# Patient Record
Sex: FEMALE | Race: WHITE | Employment: UNEMPLOYED | URBAN - METROPOLITAN AREA
[De-identification: names, ages, dates, MRNs, and addresses within clinical notes are randomized per-mention and may not be internally consistent; named-entity substitution may affect disease eponyms.]

---

## 2017-10-15 ENCOUNTER — TRANSFERRED RECORDS (OUTPATIENT)
Dept: HEALTH INFORMATION MANAGEMENT | Facility: CLINIC | Age: 37
End: 2017-10-15

## 2017-10-16 ENCOUNTER — TRANSFERRED RECORDS (OUTPATIENT)
Dept: HEALTH INFORMATION MANAGEMENT | Facility: CLINIC | Age: 37
End: 2017-10-16

## 2017-10-20 ENCOUNTER — TRANSFERRED RECORDS (OUTPATIENT)
Dept: HEALTH INFORMATION MANAGEMENT | Facility: CLINIC | Age: 37
End: 2017-10-20

## 2017-10-30 ENCOUNTER — TELEPHONE (OUTPATIENT)
Dept: GASTROENTEROLOGY | Facility: CLINIC | Age: 37
End: 2017-10-30

## 2017-10-30 NOTE — TELEPHONE ENCOUNTER
35 Pages of medical records received.   Hard copy folder created and handed over to NAE JENNINGS (B.W) 10.30.17 @ 226p

## 2017-10-30 NOTE — TELEPHONE ENCOUNTER
Film room called since images were being pushed today.  Gee will be looking for this to attach to pt's mrn.     10.30.17 @ 319p

## 2017-11-01 ENCOUNTER — CARE COORDINATION (OUTPATIENT)
Dept: GASTROENTEROLOGY | Facility: CLINIC | Age: 37
End: 2017-11-01

## 2017-11-01 NOTE — PROGRESS NOTES
[facility] Intake form:    Referring/Requesting provider and Health care System: Rogelio Hernandez from [facility]     Clinic contact - Name, Phone and Fax number: Ying   Ph: [phone], Fax: [phone]     Requested provider (if specified): Dr. Cole     Has patient been evaluated in clinic or had a procedure [facility] provider in the last 5 years: X No       Indication/Diagnosis for consultation: Biliary Colic     Is diagnosis on list of approved diagnosis: X Yes     Has patient been evaluated by another Gastroenterologist? X No     Imaging completed:     CT scan     X Yes   MRI       X  Yes     Procedures:     Upper Endoscopy/EGD X Yes     Endoscopic Ultrasound/EUS X No     ERCP X Yes     Colonoscopy X No       Are images able/being pushed to our system? X No - Asked Ying at HIM to mail/push images.     Is patient aware of request for clinc consultation and ok to be contacted to schedule? X Yes     Referral Received: 10/208/2017    Hard Copy chart created/ Records received: 11/1/2017- no hard copy records    MD review date:                             Clinical History (per RN review of records provided):     Recommendations/Orders:

## 2017-11-03 NOTE — PROGRESS NOTES
Called referring clinic to have CT images sent to us.  Ying will have them pushed.  Called film room to have them watch for them.    Clinical History (per RN review of records provided):   RUQ abdominal pain, Hx: BRIANDA s/p Linx onn 2/2017, appendectomy, cholecystectomy, hysterectomy.  RUQ abdominal pain with elevated liver enzymes, radiates to back, normal lipase with slightly elevated AST and ALT on 10/25/2017, surgery consult with EGD on 10/16/2017 which was normal.  Was discharged home for outpatient MRCP and surgery follow up.    11/7/2017 - All images are now in PACS.  Plan to have Dr. Cole review hard copy chart 11/13/2017.    Tal GUNN RN Coordinator  Dr. Cole, Dr. Singleton & Dr. Bruce  [facility]  [phone]

## 2017-11-06 NOTE — PROGRESS NOTES
Additional medical records requested on 11.6.17   Please see sent letters for letter faxed.     Can be closed on: 12.6.17      11.6.17 @ 948a

## 2018-01-16 ENCOUNTER — TELEPHONE (OUTPATIENT)
Dept: GASTROENTEROLOGY | Facility: CLINIC | Age: 38
End: 2018-01-16

## 2018-01-16 NOTE — TELEPHONE ENCOUNTER
Spoke with patient reminding of upcoming appointment with Kavita Barnes 1/24.  She plans to attend. Number provided if needs to reschedule.

## 2018-01-24 ENCOUNTER — OFFICE VISIT (OUTPATIENT)
Dept: GASTROENTEROLOGY | Facility: CLINIC | Age: 38
End: 2018-01-24
Payer: COMMERCIAL

## 2018-01-24 VITALS
DIASTOLIC BLOOD PRESSURE: 65 MMHG | BODY MASS INDEX: 25.18 KG/M2 | HEIGHT: 69 IN | TEMPERATURE: 98.2 F | OXYGEN SATURATION: 96 % | SYSTOLIC BLOOD PRESSURE: 112 MMHG | WEIGHT: 170 LBS | HEART RATE: 87 BPM

## 2018-01-24 DIAGNOSIS — R10.11 ABDOMINAL PAIN, RIGHT UPPER QUADRANT: Primary | ICD-10-CM

## 2018-01-24 RX ORDER — HYDROXYZINE HYDROCHLORIDE 50 MG/1
TABLET, FILM COATED ORAL
COMMUNITY
Start: 2013-02-10

## 2018-01-24 RX ORDER — ALBUTEROL SULFATE 90 UG/1
2 AEROSOL, METERED RESPIRATORY (INHALATION) EVERY 6 HOURS
COMMUNITY

## 2018-01-24 ASSESSMENT — ENCOUNTER SYMPTOMS
BLOOD IN STOOL: 0
EXERCISE INTOLERANCE: 0
ABDOMINAL PAIN: 0
FEVER: 0
SYNCOPE: 0
BLOATING: 0
BOWEL INCONTINENCE: 0
MYALGIAS: 1
DEPRESSION: 1
NIGHT SWEATS: 0
POOR WOUND HEALING: 0
POLYDIPSIA: 0
SPUTUM PRODUCTION: 0
ORTHOPNEA: 0
DIARRHEA: 1
COUGH DISTURBING SLEEP: 0
WHEEZING: 0
MUSCLE WEAKNESS: 0
SLEEP DISTURBANCES DUE TO BREATHING: 0
COUGH: 0
DECREASED CONCENTRATION: 1
NERVOUS/ANXIOUS: 1
HEMOPTYSIS: 0
CHILLS: 1
NECK PAIN: 0
PANIC: 1
HEARTBURN: 0
NAUSEA: 1
HYPOTENSION: 0
WEIGHT LOSS: 0
POSTURAL DYSPNEA: 0
RECTAL PAIN: 0
LIGHT-HEADEDNESS: 1
DECREASED APPETITE: 1
WEIGHT GAIN: 0
ARTHRALGIAS: 1
HYPERTENSION: 0
INSOMNIA: 1
BACK PAIN: 1
JAUNDICE: 0
HALLUCINATIONS: 0
PALPITATIONS: 1
CONSTIPATION: 0
LEG PAIN: 0
INCREASED ENERGY: 0
NAIL CHANGES: 0
SHORTNESS OF BREATH: 0
STIFFNESS: 1
SNORES LOUDLY: 0
POLYPHAGIA: 0
ALTERED TEMPERATURE REGULATION: 1
VOMITING: 0
MUSCLE CRAMPS: 0
FATIGUE: 1
JOINT SWELLING: 0
DYSPNEA ON EXERTION: 1
SKIN CHANGES: 0

## 2018-01-24 ASSESSMENT — PAIN SCALES - GENERAL: PAINLEVEL: NO PAIN (0)

## 2018-01-24 NOTE — LETTER
"1/24/2018       RE: Glenny Keene  [address]   [facility] [address]     Dear Colleague,    Thank you for referring your patient, Glenny Keene, to the [facility] at [facility]. Please see a copy of my visit note below.    REASON FOR CONSULT: \"Possible Sphincter of Oddi dysfunction\"   REFERRING PHYSICIAN: Dr. Rogelio Hernandez MD of [facility] in [place], [place].        HISTORY OF PRESENT ILLNESS:     Glenny Keene is a 37 year old female with History of GERD s/p Linx in 2/17, appendectomy, cholecystectomy in 2011 for biliary colic, hysterectomy, history of post-cholecystectomy abdominal pain associated with elevated LFT's. She reports following cholecystectomy she continued to have RUQ abdominal pain, and found to have elevated liver studies. As a results, she underwent ERCP in 2013 for further evaluation done by Dr. Verdin in [facility], this was complicated by small bowel perforation and bile duct injury/rupture requiring stent placement. She was later seen at [facility] in 2013 where she underwent diagnostic ERCP without intervention (per patient, no records of this). She denies hx of pancreatitis or bile duct stones. She was hospitalized  in October for abdominal pain, noted to have elevated liver studies upon admission. She underwent extensive workup including US of abdomen, EGD, CT scan of abdomen, and MRCP. RUQ ultrasound completed on 10/16 revealed mild postoperative dilation of the CBD measuring up to 7 mm, without choledolithiasis. CT scan done on 10/16/17 showed mild postoperative biliary dilation and good position of LINX. MRCP completed on 10/20/17 showed mild dilation of the CBD measuring up to 6 mm in size without evidence of choledocholithiasis. EGD done while hospitalized, showed no acute findings and LINX to be in good position.   She reports RUQ abdominal pain that is severe and occurs every few months. The " pain is sharp and nagging at times, often comes on suddenly requiring ER visits. She reports 3-4 ER visits in the past. She states she is  pain free in between pain flares. Denies  daily narcotic use. She takes ibuprofen and tylenol as needed. Pain is not associated with eating. She reports daily nausea and intermittent diarrhea since hospital discharge.  Weight has been stable. She reports good control of acid reflux symptoms. She denies vomiting, constipation, blood in stool, juandice, itching, dark-colored urine, fever, night sweats or chills.       PAST MEDICAL HISTORY:  ADHD   Anxiety   Depression   Asthma   GERD   PTSD       PAST SURGICAL HISTORY:   Appendectomy   ERCP (ruptured bile duct, perforated bowel)   Lap LINX procedure   Partial Hysterectomy   Laparoscopic cholecystectomy          MEDICATIONS:   See Epic medication list, medications reviewed       FAMILY HISTORY:  Noncontributory       SOCIAL HISTORY:    Home situation: lives at home with 3 children   Occupation/Schooling: not employed   Tobacco use: 1/2 pack per day   Alcohol use: history of heavy alcohol use, sober since    Drug use:  Meth and cocaine use sober since 2013      REVIEW OF SYSTEMS:   A comprehensive review of systems was performed and was noted to be negative except as above.      PHYSICAL EXAM:  VITAL SIGNS: Stable  GENERAL: healthy, alert, well nourished, well hydrated, no distress  EYES: Eyes grossly normal to inspection, anicteric sclera   RESP: lungs clear to auscultation - no rales, no rhonchi, no wheezes  CV: regular rates and rhythm, normal S1 S2, no murmur   ABDOMEN: soft, no tenderness, no hepatosplenomegaly, no masses, distention, guarding, or rebound, normal bowel sounds  PSYCH: Alert and oriented times 3      ASSESSMENT AND RECOMMENDATIONS:   Glenny Keene is a 37 year old female with history of GERD s/p Linx in ,  post-cholecystectomy abdominal pain associated with elevated LFT's and mildly  dilated bile duct. She underwent extensive workup including US of abdomen, EGD, CT scan of abdomen, and MRCP. RUQ ultrasound completed on 10/16 revealed mild postoperative dilation of the CBD measuring up to 7 mm, without choledolithiasis. CT scan done on 10/16/17 showed mild postoperative biliary dilation and good position of LINX. MRCP completed on 10/20/17 showed mild dilation of the CBD measuring up to 6 mm in size without evidence of choledocholithiasis. Differential includes Sphincter of Oddi dysfunction, choledocholithiasis, and pancreatitis. At this juncture, we recommend proceeding with endoscopic ultrasound to detect if her symptoms may be contributed to common bile duct stone, and to evaluate for subtle chronic pancreatitis. We discussed at length the value of endoscopic sphincterotomy for suspected SOD. Patient was informed of uncertain benefit in relieving pain, and 50% chance she may continue to experience recurring pain. We discussed procedure-related risk, including post-ERCP pancreatitis rates are 5- 10%, risk of perforation, bleeding, infection, and risk for major complications. She verbalized understanding and wants to proceed with ERCP. She was given a trial of pancreatic enzymes (Creon) to take with meals. Recommend low fat diet and she meet with dietitian to discuss further dietary recommendations. She was advised the importance of smoking cessation.         Patient seen in conjunction with Dr. Douglas Barnes CNP   Advanced [facility]oscopy   [phone]        I spent 45 minutes with this patient face to face and explained the conditions and plans (more than 50% of time was counseling/coordination of care, as discussed above).    Again, thank you for allowing me to participate in the care of your patient.      Sincerely,    LILIBETH Monroe CNP

## 2018-01-24 NOTE — MR AVS SNAPSHOT
After Visit Summary   1/24/2018    Glenny Keene    MRN: [id number]           Patient Information     Date Of Birth          1980        Visit Information        Provider Department      1/24/2018 2:00 PM Kavita Barnes APRN [facility]        Today's Diagnoses     Abdominal pain, right upper quadrant    -  1      Care Instructions    It was a pleasure taking care of you today.  I've included a brief summary of our discussion and care plan from today's visit below.  Please review this information with your primary care provider.  ______________________________________________________________________    My recommendations are summarized as follows:  1. Take pancreatic enzymes with meals,2 with meals 1 with snacks  2.  ERCP with DR. Cole      ______________________________________________________________________    Who do I call with any questions after my visit?  Please be in touch if there are any further questions that arise following today's visit.  There are multiple ways to contact your gastroenterology care team.        During business hours, you may reach a Gastroenterology nurse at [phone].       To schedule or reschedule an appointment, please call [phone].       You can always send a secure message through [employer].  [employer] messages are answered by your nurse or doctor typically within 24 hours.  Please allow extra time on weekends and holidays.        For urgent/emergent questions after business hours, you may reach the on-call GI Fellow by contacting the [facility] at [phone].         How will I get the results of any tests ordered?    You will receive all of your results.  If you have signed up for [employer], any tests ordered at your visit will be available to you after your physician reviews them.  Typically this takes 1-2 weeks.  If there are urgent results that require a change in your care plan, your physician  or nurse will call you to discuss the next steps.      What is [employer]?  [employer] is a secure way for you to access all of your healthcare records from the [facility].  It is a web based computer program, so you can sign on to it from any location.  It also allows you to send secure messages to your care team.  I recommend signing up for [employer] access if you have not already done so and are comfortable with using a computer.      How to I schedule a follow-up visit?  If you did not schedule a follow-up visit today, please call [phone] to schedule a follow-up office visit.                Who do I call for additional appointments?      To schedule an appointment with the Pain Clinic, please call [phone].      To schedule an appointment with the Radiology Department, please call [phone].      To schedule an appointment with the Pre-procedure Assessment Clinic (PAC), please call [phone].      To schedule your Endoscopy appointment, please call [phone].      To schedule an appointment with the Dietitian, please call [phone].      To schedule an appointment with the Financial Counselor, please call [phone].      To schedule an appointment with our Health Psychologist, please call         [phone].           Thank you,    Kavita Barnes, CNP  [facility]  Division of Gastroenterology                  Follow-ups after your visit        Future tests that were ordered for you today     Open Future Orders        Priority Expected Expires Ordered    Upper EUS with ERCP Routine  3/10/2018 1/24/2018            Who to contact     Please call your clinic at [phone] to:    Ask questions about your health    Make or cancel appointments    Discuss your medicines    Learn about your test results    Speak to your doctor   If you have compliments or concerns about an experience at your clinic, or if you wish to file a complaint, please contact [facility]  "Physicians Patient Relations at [phone] or email us at [email or web address]         Additional Information About Your Visit        [employer] Information     [employer] gives you secure access to your electronic health record. If you see a primary care provider, you can also send messages to your care team and make appointments. If you have questions, please call your primary care clinic.  If you do not have a primary care provider, please call [phone] and they will assist you.      [employer] is an electronic gateway that provides easy, online access to your medical records. With [employer], you can request a clinic appointment, read your test results, renew a prescription or communicate with your care team.     To access your existing account, please contact your [facility] or call [phone] for assistance.        Care EveryWhere ID     This is your Care EveryWhere ID. This could be used by other organizations to access your [facility] medical records  [id number]        Your Vitals Were     Pulse Temperature Height Pulse Oximetry BMI (Body Mass Index)       87 98.2  F (36.8  C) (Oral) 1.753 m (5' 9\") 96% 25.1 kg/m2        Blood Pressure from Last 3 Encounters:   01/24/18 112/65    Weight from Last 3 Encounters:   01/24/18 77.1 kg (170 lb)                 Today's Medication Changes          These changes are accurate as of 1/24/18  3:35 PM.  If you have any questions, ask your nurse or doctor.               Start taking these medicines.        Dose/Directions    amylase-lipase-protease 07038-38350 UNITS Cpep per EC capsule   Commonly known as:  CREON   Used for:  Abdominal pain, right upper quadrant   Started by:  Kavita Barnes APRN CNP        Take 2 with meals / 1 snacks, up to 9 per day.   Quantity:  450 capsule   Refills:  6            Where to get your medicines      These medications were sent to [id number] - [place], [place] - [address]  [address]  [place] [address]     Phone:  [phone]     amylase-lipase-protease 49042-47181 UNITS Cpep per EC capsule                Primary Care Provider Office Phone # Fax #    Alicia BARNHART Deirdre, TYRA [phone]       [facility] [address] [address]        Equal Access to Services     [facility]: Hadii aad ku hadasho Soomaali, waaxda luqadaha, qaybta kaalmada adeegyada, waxay idiin hayaan adeeg kharash labhumi . So [facility] [phone].    ATENCIÓN: Si habla español, tiene a baron disposición servicios gratuitos de asistencia lingüística. Sugeyisaiah al [phone].    We comply with applicable federal civil rights laws and [place] laws. We do not discriminate on the basis of race, color, national origin, age, disability, sex, sexual orientation, or gender identity.            Thank you!     Thank you for choosing [facility]  for your care. Our goal is always to provide you with excellent care. Hearing back from our patients is one way we can continue to improve our services. Please take a few minutes to complete the written survey that you may receive in the mail after your visit with us. Thank you!             Your Updated Medication List - Protect others around you: Learn how to safely use, store and throw away your medicines at www.disposemymeds.org.          This list is accurate as of 1/24/18  3:35 PM.  Always use your most recent med list.                   Brand Name Dispense Instructions for use Diagnosis    albuterol 108 (90 BASE) MCG/ACT Inhaler    PROAIR HFA/PROVENTIL HFA/VENTOLIN HFA     Inhale 2 puffs into the lungs every 6 hours        amylase-lipase-protease 00533-10001 UNITS Cpep per EC capsule    CREON    450 capsule    Take 2 with meals / 1 snacks, up to 9 per day.    Abdominal pain, right upper quadrant       fluticasone 27.5 MCG/SPRAY spray    VERAMYST          hydrOXYzine 50 MG tablet    ATARAX          LORATADINE-D 24HR PO

## 2018-01-24 NOTE — PATIENT INSTRUCTIONS
It was a pleasure taking care of you today.  I've included a brief summary of our discussion and care plan from today's visit below.  Please review this information with your primary care provider.  ______________________________________________________________________    My recommendations are summarized as follows:  1. Take pancreatic enzymes with meals,2 with meals 1 with snacks  2.  ERCP with DR. Cole      ______________________________________________________________________    Who do I call with any questions after my visit?  Please be in touch if there are any further questions that arise following today's visit.  There are multiple ways to contact your gastroenterology care team.        During business hours, you may reach a Gastroenterology nurse at [phone].       To schedule or reschedule an appointment, please call [phone].       You can always send a secure message through [employer].  [employer] messages are answered by your nurse or doctor typically within 24 hours.  Please allow extra time on weekends and holidays.        For urgent/emergent questions after business hours, you may reach the on-call GI Fellow by contacting the [facility]  at [phone].         How will I get the results of any tests ordered?    You will receive all of your results.  If you have signed up for [employer], any tests ordered at your visit will be available to you after your physician reviews them.  Typically this takes 1-2 weeks.  If there are urgent results that require a change in your care plan, your physician or nurse will call you to discuss the next steps.      What is [employer]?  [employer] is a secure way for you to access all of your healthcare records from the [facility].  It is a web based computer program, so you can sign on to it from any location.  It also allows you to send secure messages to your care team.  I recommend signing up for [email or web address] access if you have not already  done so and are comfortable with using a computer.      How to I schedule a follow-up visit?  If you did not schedule a follow-up visit today, please call [phone] to schedule a follow-up office visit.                Who do I call for additional appointments?      To schedule an appointment with the Pain Clinic, please call [phone].      To schedule an appointment with the Radiology Department, please call [phone].      To schedule an appointment with the Pre-procedure Assessment Clinic (PAC), please call [phone].      To schedule your Endoscopy appointment, please call [phone].      To schedule an appointment with the Dietitian, please call [phone].      To schedule an appointment with the Financial Counselor, please call [phone].      To schedule an appointment with our Health Psychologist, please call         [phone].           Thank you,    Kavita Barnes, CNP  [facility]  Division of Gastroenterology

## 2018-01-24 NOTE — PROGRESS NOTES
"REASON FOR CONSULT: \"Possible Sphincter of Oddi dysfunction\"   REFERRING PHYSICIAN: Dr. Rogelio Hernandez MD of [facility] in [place], [place].        HISTORY OF PRESENT ILLNESS:     Glenny Keene is a 37 year old female with History of GERD s/p Linx in 2/17, appendectomy, cholecystectomy in 2011 for biliary colic, hysterectomy, history of post-cholecystectomy abdominal pain associated with elevated LFT's. She reports following cholecystectomy she continued to have RUQ abdominal pain, and found to have elevated liver studies. As a results, she underwent ERCP in 2013 for further evaluation done by Dr. Verdin in [facility], this was complicated by small bowel perforation and bile duct injury/rupture requiring stent placement. She was later seen at [facility] in 2013 where she underwent diagnostic ERCP without intervention (per patient, no records of this). She denies hx of pancreatitis or bile duct stones. She was hospitalized  in October for abdominal pain, noted to have elevated liver studies upon admission. She underwent extensive workup including US of abdomen, EGD, CT scan of abdomen, and MRCP. RUQ ultrasound completed on 10/16 revealed mild postoperative dilation of the CBD measuring up to 7 mm, without choledolithiasis. CT scan done on 10/16/17 showed mild postoperative biliary dilation and good position of LINX. MRCP completed on 10/20/17 showed mild dilation of the CBD measuring up to 6 mm in size without evidence of choledocholithiasis. EGD done while hospitalized, showed no acute findings and LINX to be in good position.   She reports RUQ abdominal pain that is severe and occurs every few months. The pain is sharp and nagging at times, often comes on suddenly requiring ER visits. She reports 3-4 ER visits in the past. She states she is  pain free in between pain flares. Denies  daily narcotic use. She takes ibuprofen and tylenol as needed. Pain is not associated with eating. She reports " daily nausea and intermittent diarrhea since hospital discharge.  Weight has been stable. She reports good control of acid reflux symptoms. She denies vomiting, constipation, blood in stool, juandice, itching, dark-colored urine, fever, night sweats or chills.       PAST MEDICAL HISTORY:  ADHD   Anxiety   Depression   Asthma   GERD   PTSD       PAST SURGICAL HISTORY:   Appendectomy   ERCP (ruptured bile duct, perforated bowel)   Lap LINX procedure   Partial Hysterectomy   Laparoscopic cholecystectomy          MEDICATIONS:   See Epic medication list, medications reviewed       FAMILY HISTORY:  Noncontributory       SOCIAL HISTORY:    Home situation: lives at home with 3 children   Occupation/Schooling: not employed   Tobacco use: 1/2 pack per day   Alcohol use: history of heavy alcohol use, sober since    Drug use:  Meth and cocaine use sober since 2013      REVIEW OF SYSTEMS:   A comprehensive review of systems was performed and was noted to be negative except as above.      PHYSICAL EXAM:  VITAL SIGNS: Stable  GENERAL: healthy, alert, well nourished, well hydrated, no distress  EYES: Eyes grossly normal to inspection, anicteric sclera   RESP: lungs clear to auscultation - no rales, no rhonchi, no wheezes  CV: regular rates and rhythm, normal S1 S2, no murmur   ABDOMEN: soft, no tenderness, no hepatosplenomegaly, no masses, distention, guarding, or rebound, normal bowel sounds  PSYCH: Alert and oriented times 3      ASSESSMENT AND RECOMMENDATIONS:   Glenny Keene is a 37 year old female with history of GERD s/p Linx in ,  post-cholecystectomy abdominal pain associated with elevated LFT's and mildly dilated bile duct. She underwent extensive workup including US of abdomen, EGD, CT scan of abdomen, and MRCP. RUQ ultrasound completed on 10/16 revealed mild postoperative dilation of the CBD measuring up to 7 mm, without choledolithiasis. CT scan done on 10/16/17 showed mild postoperative  biliary dilation and good position of LINX. MRCP completed on 10/20/17 showed mild dilation of the CBD measuring up to 6 mm in size without evidence of choledocholithiasis. Differential includes Sphincter of Oddi dysfunction, choledocholithiasis, and pancreatitis. At this juncture, we recommend proceeding with endoscopic ultrasound to detect if her symptoms may be contributed to common bile duct stone, and to evaluate for subtle chronic pancreatitis. We discussed at length the value of endoscopic sphincterotomy for suspected SOD. Patient was informed of uncertain benefit in relieving pain, and 50% chance she may continue to experience recurring pain. We discussed procedure-related risk, including post-ERCP pancreatitis rates are 5- 10%, risk of perforation, bleeding, infection, and risk for major complications. She verbalized understanding and wants to proceed with ERCP. She was given a trial of pancreatic enzymes (Creon) to take with meals. Recommend low fat diet and she meet with dietitian to discuss further dietary recommendations. She was advised the importance of smoking cessation.         Patient seen in conjunction with Dr. Douglas Barnes, [facility]   Advanced Endoscopy   [phone]        I spent 45 minutes with this patient face to face and explained the conditions and plans (more than 50% of time was counseling/coordination of care, as discussed above).           Answers for HPI/ROS submitted by the patient on 1/24/2018   General Symptoms: Yes  Skin Symptoms: Yes  HENT Symptoms: No  EYE SYMPTOMS: No  HEART SYMPTOMS: Yes  LUNG SYMPTOMS: Yes  INTESTINAL SYMPTOMS: Yes  URINARY SYMPTOMS: No  GYNECOLOGIC SYMPTOMS: No  BREAST SYMPTOMS: No  SKELETAL SYMPTOMS: Yes  BLOOD SYMPTOMS: No  NERVOUS SYSTEM SYMPTOMS: No  MENTAL HEALTH SYMPTOMS: Yes  Fever: No  Loss of appetite: Yes  Weight loss: No  Weight gain: No  Fatigue: Yes  Night sweats: No  Chills: Yes  Increased stress: Yes  Excessive hunger: No  Excessive  thirst: No  Feeling hot or cold when others believe the temperature is normal: Yes  Loss of height: No  Post-operative complications: No  Surgical site pain: No  Hallucinations: No  Change in or Loss of Energy: No  Hyperactivity: Yes  Confusion: No  Changes in hair: No  Changes in moles/birth marks: No  Itching: No  Rashes: No  Changes in nails: No  Acne: No  Hair in places you don't want it: No  Change in facial hair: No  Warts: No  Non-healing sores: No  Scarring: No  Flaking of skin: No  Color changes of hands/feet in cold : Yes  Sun sensitivity: No  Skin thickening: No  Cough: No  Sputum or phlegm: No  Coughing up blood: No  Difficulty breating or shortness of breath: No  Snoring: No  Wheezing: No  Difficulty breathing on exertion: Yes  Nighttime Cough: No  Difficulty breathing when lying flat: No  Chest pain or pressure: Yes  Fast or irregular heartbeat: Yes  Pain in legs with walking: No  Trouble breathing while lying down: No  Fingers or toes appear blue: No  High blood pressure: No  Low blood pressure: No  Fainting: No  Murmurs: No  Pacemaker: No  Varicose veins: No  Edema or swelling: No  Wake up at night with shortness of breath: No  Light-headedness: Yes  Exercise intolerance: No  Heart burn or indigestion: No  Nausea: Yes  Vomiting: No  Abdominal pain: No  Bloating: No  Constipation: No  Diarrhea: Yes  Blood in stool: No  Black stools: No  Rectal or Anal pain: No  Fecal incontinence: No  Yellowing of skin or eyes: No  Vomit with blood: No  Change in stools: No  Back pain: Yes  Muscle aches: Yes  Neck pain: No  Swollen joints: No  Joint pain: Yes  Bone pain: Yes  Muscle cramps: No  Muscle weakness: No  Joint stiffness: Yes  Bone fracture: No  Nervous or Anxious: Yes  Depression: Yes  Trouble sleeping: Yes  Trouble thinking or concentrating: Yes  Mood changes: Yes  Panic attacks: Yes

## 2018-01-24 NOTE — NURSING NOTE
"Chief Complaint   Patient presents with     Clinic Care Coordination - Initial     New pt consult.        Vitals:    01/24/18 1416   BP: 112/65   BP Location: Left arm   Patient Position: Chair   Cuff Size: Adult Regular   Pulse: 87   Temp: 98.2  F (36.8  C)   TempSrc: Oral   SpO2: 96%   Weight: 170 lb   Height: 5' 9\"       Body mass index is 25.1 kg/(m^2).  Melanie MARX LPN                  "

## 2018-01-27 ENCOUNTER — HEALTH MAINTENANCE LETTER (OUTPATIENT)
Age: 38
End: 2018-01-27

## 2018-02-28 ENCOUNTER — ANESTHESIA EVENT (OUTPATIENT)
Dept: SURGERY | Facility: CLINIC | Age: 38
End: 2018-02-28
Payer: COMMERCIAL

## 2018-02-28 ENCOUNTER — SURGERY (OUTPATIENT)
Age: 38
End: 2018-02-28

## 2018-02-28 ENCOUNTER — ANESTHESIA (OUTPATIENT)
Dept: SURGERY | Facility: CLINIC | Age: 38
End: 2018-02-28
Payer: COMMERCIAL

## 2018-02-28 ENCOUNTER — APPOINTMENT (OUTPATIENT)
Dept: GENERAL RADIOLOGY | Facility: CLINIC | Age: 38
End: 2018-02-28
Attending: INTERNAL MEDICINE
Payer: COMMERCIAL

## 2018-02-28 ENCOUNTER — HOSPITAL ENCOUNTER (OUTPATIENT)
Facility: CLINIC | Age: 38
Discharge: HOME | End: 2018-02-28
Attending: INTERNAL MEDICINE | Admitting: INTERNAL MEDICINE
Payer: COMMERCIAL

## 2018-02-28 VITALS
WEIGHT: 175.04 LBS | RESPIRATION RATE: 15 BRPM | HEART RATE: 76 BPM | SYSTOLIC BLOOD PRESSURE: 109 MMHG | HEIGHT: 69 IN | BODY MASS INDEX: 25.93 KG/M2 | OXYGEN SATURATION: 96 % | TEMPERATURE: 97.6 F | DIASTOLIC BLOOD PRESSURE: 74 MMHG

## 2018-02-28 LAB
ALBUMIN SERPL-MCNC: 3.3 G/DL (ref 3.4–5)
ALP SERPL-CCNC: 58 U/L (ref 40–150)
ALT SERPL W P-5'-P-CCNC: 58 U/L (ref 0–50)
AMYLASE SERPL-CCNC: 70 U/L (ref 30–110)
ANION GAP SERPL CALCULATED.3IONS-SCNC: 5 MMOL/L (ref 3–14)
AST SERPL W P-5'-P-CCNC: 41 U/L (ref 0–45)
BILIRUB SERPL-MCNC: 0.3 MG/DL (ref 0.2–1.3)
BUN SERPL-MCNC: 13 MG/DL (ref 7–30)
CALCIUM SERPL-MCNC: 8.6 MG/DL (ref 8.5–10.1)
CHLORIDE SERPL-SCNC: 103 MMOL/L (ref 94–109)
CO2 SERPL-SCNC: 31 MMOL/L (ref 20–32)
CREAT SERPL-MCNC: 0.96 MG/DL (ref 0.52–1.04)
ERCP: NORMAL
ERYTHROCYTE [DISTWIDTH] IN BLOOD BY AUTOMATED COUNT: 12.4 % (ref 10–15)
GFR SERPL CREATININE-BSD FRML MDRD: 65 ML/MIN/1.7M2
GLUCOSE BLDC GLUCOMTR-MCNC: 95 MG/DL (ref 70–99)
GLUCOSE SERPL-MCNC: 90 MG/DL (ref 70–99)
HCG UR QL: NEGATIVE
HCT VFR BLD AUTO: 43.6 % (ref 35–47)
HGB BLD-MCNC: 14.2 G/DL (ref 11.7–15.7)
LIPASE SERPL-CCNC: 215 U/L (ref 73–393)
MCH RBC QN AUTO: 29.8 PG (ref 26.5–33)
MCHC RBC AUTO-ENTMCNC: 32.6 G/DL (ref 31.5–36.5)
MCV RBC AUTO: 91 FL (ref 78–100)
PLATELET # BLD AUTO: 221 10E9/L (ref 150–450)
POTASSIUM SERPL-SCNC: 3.8 MMOL/L (ref 3.4–5.3)
PROT SERPL-MCNC: 6.6 G/DL (ref 6.8–8.8)
RBC # BLD AUTO: 4.77 10E12/L (ref 3.8–5.2)
SODIUM SERPL-SCNC: 139 MMOL/L (ref 133–144)
UPPER EUS: NORMAL
WBC # BLD AUTO: 7.2 10E9/L (ref 4–11)

## 2018-02-28 PROCEDURE — 25000128 H RX IP 250 OP 636: Performed by: ANESTHESIOLOGY

## 2018-02-28 PROCEDURE — 36000059 ZZH SURGERY LEVEL 3 EA 15 ADDTL MIN UMMC: Performed by: INTERNAL MEDICINE

## 2018-02-28 PROCEDURE — 71000015 ZZH RECOVERY PHASE 1 LEVEL 2 EA ADDTL HR: Performed by: INTERNAL MEDICINE

## 2018-02-28 PROCEDURE — 25000125 ZZHC RX 250: Performed by: ANESTHESIOLOGY

## 2018-02-28 PROCEDURE — 80053 COMPREHEN METABOLIC PANEL: CPT | Performed by: INTERNAL MEDICINE

## 2018-02-28 PROCEDURE — 36000061 ZZH SURGERY LEVEL 3 W FLUORO 1ST 30 MIN - UMMC: Performed by: INTERNAL MEDICINE

## 2018-02-28 PROCEDURE — 83690 ASSAY OF LIPASE: CPT | Performed by: INTERNAL MEDICINE

## 2018-02-28 PROCEDURE — 82962 GLUCOSE BLOOD TEST: CPT

## 2018-02-28 PROCEDURE — 25000125 ZZHC RX 250: Performed by: NURSE ANESTHETIST, CERTIFIED REGISTERED

## 2018-02-28 PROCEDURE — 71000027 ZZH RECOVERY PHASE 2 EACH 15 MINS: Performed by: INTERNAL MEDICINE

## 2018-02-28 PROCEDURE — 25000125 ZZHC RX 250: Performed by: INTERNAL MEDICINE

## 2018-02-28 PROCEDURE — 25000566 ZZH SEVOFLURANE, EA 15 MIN: Performed by: INTERNAL MEDICINE

## 2018-02-28 PROCEDURE — 27210794 ZZH OR GENERAL SUPPLY STERILE: Performed by: INTERNAL MEDICINE

## 2018-02-28 PROCEDURE — 40000277 XR SURGERY CARM FLUORO LESS THAN 5 MIN W STILLS: Mod: TC

## 2018-02-28 PROCEDURE — 37000008 ZZH ANESTHESIA TECHNICAL FEE, 1ST 30 MIN: Performed by: INTERNAL MEDICINE

## 2018-02-28 PROCEDURE — 27210995 ZZH RX 272: Performed by: INTERNAL MEDICINE

## 2018-02-28 PROCEDURE — 25000132 ZZH RX MED GY IP 250 OP 250 PS 637: Performed by: ANESTHESIOLOGY

## 2018-02-28 PROCEDURE — 81025 URINE PREGNANCY TEST: CPT | Performed by: ANESTHESIOLOGY

## 2018-02-28 PROCEDURE — C1725 CATH, TRANSLUMIN NON-LASER: HCPCS | Performed by: INTERNAL MEDICINE

## 2018-02-28 PROCEDURE — 85027 COMPLETE CBC AUTOMATED: CPT | Performed by: INTERNAL MEDICINE

## 2018-02-28 PROCEDURE — C1726 CATH, BAL DIL, NON-VASCULAR: HCPCS | Performed by: INTERNAL MEDICINE

## 2018-02-28 PROCEDURE — 71000014 ZZH RECOVERY PHASE 1 LEVEL 2 FIRST HR: Performed by: INTERNAL MEDICINE

## 2018-02-28 PROCEDURE — 36415 COLL VENOUS BLD VENIPUNCTURE: CPT | Performed by: INTERNAL MEDICINE

## 2018-02-28 PROCEDURE — C1769 GUIDE WIRE: HCPCS | Performed by: INTERNAL MEDICINE

## 2018-02-28 PROCEDURE — C9399 UNCLASSIFIED DRUGS OR BIOLOG: HCPCS | Performed by: NURSE ANESTHETIST, CERTIFIED REGISTERED

## 2018-02-28 PROCEDURE — 82150 ASSAY OF AMYLASE: CPT | Performed by: INTERNAL MEDICINE

## 2018-02-28 PROCEDURE — 40000170 ZZH STATISTIC PRE-PROCEDURE ASSESSMENT II: Performed by: INTERNAL MEDICINE

## 2018-02-28 PROCEDURE — 25000128 H RX IP 250 OP 636: Performed by: NURSE ANESTHETIST, CERTIFIED REGISTERED

## 2018-02-28 PROCEDURE — 37000009 ZZH ANESTHESIA TECHNICAL FEE, EACH ADDTL 15 MIN: Performed by: INTERNAL MEDICINE

## 2018-02-28 PROCEDURE — C1877 STENT, NON-COAT/COV W/O DEL: HCPCS | Performed by: INTERNAL MEDICINE

## 2018-02-28 DEVICE — STENT FREEMAN PANCREA FLEX 7FRX7CM SGL PIGTAIL: Type: IMPLANTABLE DEVICE | Site: BILE DUCT | Status: FUNCTIONAL

## 2018-02-28 RX ORDER — DEXAMETHASONE SODIUM PHOSPHATE 4 MG/ML
INJECTION, SOLUTION INTRA-ARTICULAR; INTRALESIONAL; INTRAMUSCULAR; INTRAVENOUS; SOFT TISSUE PRN
Status: DISCONTINUED | OUTPATIENT
Start: 2018-02-28 | End: 2018-02-28

## 2018-02-28 RX ORDER — OXYCODONE HYDROCHLORIDE 5 MG/1
5 TABLET ORAL EVERY 4 HOURS PRN
Status: DISCONTINUED | OUTPATIENT
Start: 2018-02-28 | End: 2018-02-28 | Stop reason: HOSPADM

## 2018-02-28 RX ORDER — NALOXONE HYDROCHLORIDE 0.4 MG/ML
.1-.4 INJECTION, SOLUTION INTRAMUSCULAR; INTRAVENOUS; SUBCUTANEOUS
Status: DISCONTINUED | OUTPATIENT
Start: 2018-02-28 | End: 2018-02-28 | Stop reason: HOSPADM

## 2018-02-28 RX ORDER — SCOLOPAMINE TRANSDERMAL SYSTEM 1 MG/1
1 PATCH, EXTENDED RELEASE TRANSDERMAL
Status: DISCONTINUED | OUTPATIENT
Start: 2018-02-28 | End: 2018-02-28 | Stop reason: HOSPADM

## 2018-02-28 RX ORDER — LIDOCAINE HYDROCHLORIDE 20 MG/ML
INJECTION, SOLUTION INFILTRATION; PERINEURAL PRN
Status: DISCONTINUED | OUTPATIENT
Start: 2018-02-28 | End: 2018-02-28

## 2018-02-28 RX ORDER — DIMENHYDRINATE 50 MG/ML
25 INJECTION, SOLUTION INTRAMUSCULAR; INTRAVENOUS
Status: DISCONTINUED | OUTPATIENT
Start: 2018-02-28 | End: 2018-02-28 | Stop reason: HOSPADM

## 2018-02-28 RX ORDER — FENTANYL CITRATE 50 UG/ML
INJECTION, SOLUTION INTRAMUSCULAR; INTRAVENOUS PRN
Status: DISCONTINUED | OUTPATIENT
Start: 2018-02-28 | End: 2018-02-28

## 2018-02-28 RX ORDER — SODIUM CHLORIDE, SODIUM LACTATE, POTASSIUM CHLORIDE, CALCIUM CHLORIDE 600; 310; 30; 20 MG/100ML; MG/100ML; MG/100ML; MG/100ML
INJECTION, SOLUTION INTRAVENOUS CONTINUOUS
Status: DISCONTINUED | OUTPATIENT
Start: 2018-02-28 | End: 2018-02-28 | Stop reason: HOSPADM

## 2018-02-28 RX ORDER — LIDOCAINE 40 MG/G
CREAM TOPICAL
Status: DISCONTINUED | OUTPATIENT
Start: 2018-02-28 | End: 2018-02-28 | Stop reason: HOSPADM

## 2018-02-28 RX ORDER — FLUMAZENIL 0.1 MG/ML
0.2 INJECTION, SOLUTION INTRAVENOUS
Status: DISCONTINUED | OUTPATIENT
Start: 2018-02-28 | End: 2018-02-28 | Stop reason: HOSPADM

## 2018-02-28 RX ORDER — PROMETHAZINE HYDROCHLORIDE 25 MG/ML
12.5 INJECTION, SOLUTION INTRAMUSCULAR; INTRAVENOUS
Status: DISCONTINUED | OUTPATIENT
Start: 2018-02-28 | End: 2018-02-28 | Stop reason: HOSPADM

## 2018-02-28 RX ORDER — PROPOFOL 10 MG/ML
INJECTION, EMULSION INTRAVENOUS PRN
Status: DISCONTINUED | OUTPATIENT
Start: 2018-02-28 | End: 2018-02-28

## 2018-02-28 RX ORDER — FENTANYL CITRATE 50 UG/ML
25-50 INJECTION, SOLUTION INTRAMUSCULAR; INTRAVENOUS
Status: DISCONTINUED | OUTPATIENT
Start: 2018-02-28 | End: 2018-02-28 | Stop reason: HOSPADM

## 2018-02-28 RX ORDER — INDOMETHACIN 50 MG/1
100 SUPPOSITORY RECTAL
Status: COMPLETED | OUTPATIENT
Start: 2018-02-28 | End: 2018-02-28

## 2018-02-28 RX ORDER — ONDANSETRON 2 MG/ML
INJECTION INTRAMUSCULAR; INTRAVENOUS PRN
Status: DISCONTINUED | OUTPATIENT
Start: 2018-02-28 | End: 2018-02-28

## 2018-02-28 RX ORDER — ONDANSETRON 4 MG/1
4 TABLET, ORALLY DISINTEGRATING ORAL EVERY 30 MIN PRN
Status: DISCONTINUED | OUTPATIENT
Start: 2018-02-28 | End: 2018-02-28 | Stop reason: HOSPADM

## 2018-02-28 RX ORDER — LEVOFLOXACIN 5 MG/ML
INJECTION, SOLUTION INTRAVENOUS PRN
Status: DISCONTINUED | OUTPATIENT
Start: 2018-02-28 | End: 2018-02-28

## 2018-02-28 RX ORDER — MEPERIDINE HYDROCHLORIDE 50 MG/ML
12.5 INJECTION INTRAMUSCULAR; INTRAVENOUS; SUBCUTANEOUS
Status: DISCONTINUED | OUTPATIENT
Start: 2018-02-28 | End: 2018-02-28 | Stop reason: HOSPADM

## 2018-02-28 RX ORDER — DEXAMETHASONE SODIUM PHOSPHATE 4 MG/ML
4 INJECTION, SOLUTION INTRA-ARTICULAR; INTRALESIONAL; INTRAMUSCULAR; INTRAVENOUS; SOFT TISSUE EVERY 10 MIN PRN
Status: DISCONTINUED | OUTPATIENT
Start: 2018-02-28 | End: 2018-02-28 | Stop reason: HOSPADM

## 2018-02-28 RX ORDER — KETOROLAC TROMETHAMINE 30 MG/ML
30 INJECTION, SOLUTION INTRAMUSCULAR; INTRAVENOUS EVERY 6 HOURS PRN
Status: DISCONTINUED | OUTPATIENT
Start: 2018-02-28 | End: 2018-02-28 | Stop reason: HOSPADM

## 2018-02-28 RX ORDER — ONDANSETRON 2 MG/ML
4 INJECTION INTRAMUSCULAR; INTRAVENOUS EVERY 30 MIN PRN
Status: DISCONTINUED | OUTPATIENT
Start: 2018-02-28 | End: 2018-02-28 | Stop reason: HOSPADM

## 2018-02-28 RX ADMIN — OXYCODONE HYDROCHLORIDE 5 MG: 5 TABLET ORAL at 11:16

## 2018-02-28 RX ADMIN — ROCURONIUM BROMIDE 20 MG: 10 INJECTION INTRAVENOUS at 08:30

## 2018-02-28 RX ADMIN — MIDAZOLAM 2 MG: 1 INJECTION INTRAMUSCULAR; INTRAVENOUS at 07:39

## 2018-02-28 RX ADMIN — LEVOFLOXACIN 500 MG: 5 INJECTION, SOLUTION INTRAVENOUS at 08:59

## 2018-02-28 RX ADMIN — SCOPALAMINE 1 PATCH: 1 PATCH, EXTENDED RELEASE TRANSDERMAL at 07:32

## 2018-02-28 RX ADMIN — FENTANYL CITRATE 100 MCG: 50 INJECTION, SOLUTION INTRAMUSCULAR; INTRAVENOUS at 07:50

## 2018-02-28 RX ADMIN — FENTANYL CITRATE 50 MCG: 50 INJECTION, SOLUTION INTRAMUSCULAR; INTRAVENOUS at 08:34

## 2018-02-28 RX ADMIN — INDOMETHACIN 100 MG: 50 SUPPOSITORY RECTAL at 09:15

## 2018-02-28 RX ADMIN — Medication 0.2 MG: at 10:19

## 2018-02-28 RX ADMIN — PROPOFOL 180 MG: 10 INJECTION, EMULSION INTRAVENOUS at 07:50

## 2018-02-28 RX ADMIN — SUGAMMADEX 200 MG: 100 INJECTION, SOLUTION INTRAVENOUS at 09:20

## 2018-02-28 RX ADMIN — FENTANYL CITRATE 50 MCG: 50 INJECTION, SOLUTION INTRAMUSCULAR; INTRAVENOUS at 07:45

## 2018-02-28 RX ADMIN — SIMETHICONE 133 MG: 63.3; 3.7 SOLUTION/ DROPS ORAL at 08:16

## 2018-02-28 RX ADMIN — WATER 100 ML: 100 IRRIGANT IRRIGATION at 08:17

## 2018-02-28 RX ADMIN — SODIUM CHLORIDE, POTASSIUM CHLORIDE, SODIUM LACTATE AND CALCIUM CHLORIDE: 600; 310; 30; 20 INJECTION, SOLUTION INTRAVENOUS at 07:39

## 2018-02-28 RX ADMIN — FENTANYL CITRATE 50 MCG: 50 INJECTION, SOLUTION INTRAMUSCULAR; INTRAVENOUS at 09:29

## 2018-02-28 RX ADMIN — ONDANSETRON 4 MG: 2 INJECTION INTRAMUSCULAR; INTRAVENOUS at 09:29

## 2018-02-28 RX ADMIN — LIDOCAINE HYDROCHLORIDE 100 MG: 20 INJECTION, SOLUTION INFILTRATION; PERINEURAL at 07:50

## 2018-02-28 RX ADMIN — SODIUM CHLORIDE, POTASSIUM CHLORIDE, SODIUM LACTATE AND CALCIUM CHLORIDE: 600; 310; 30; 20 INJECTION, SOLUTION INTRAVENOUS at 08:45

## 2018-02-28 RX ADMIN — ONDANSETRON 4 MG: 2 INJECTION INTRAMUSCULAR; INTRAVENOUS at 07:50

## 2018-02-28 RX ADMIN — FENTANYL CITRATE 25 MCG: 50 INJECTION, SOLUTION INTRAMUSCULAR; INTRAVENOUS at 09:44

## 2018-02-28 RX ADMIN — DEXAMETHASONE SODIUM PHOSPHATE 8 MG: 4 INJECTION, SOLUTION INTRA-ARTICULAR; INTRALESIONAL; INTRAMUSCULAR; INTRAVENOUS; SOFT TISSUE at 07:50

## 2018-02-28 RX ADMIN — Medication 0.3 MG: at 10:50

## 2018-02-28 RX ADMIN — ROCURONIUM BROMIDE 50 MG: 10 INJECTION INTRAVENOUS at 07:50

## 2018-02-28 NOTE — BRIEF OP NOTE
[facility] Brief Operative Note    Pre-operative diagnosis: Elevated LFT's   Post-operative diagnosis * No post-op diagnosis entered *   Procedure: Procedure(s):  Upper Endoscopy Ultrasound, Endoscopic Retrograde Cholangiopancreatogram with biliary  sludge removal and stent placement - Wound Class: II-Clean Contaminated  Endoscopic Retrograde Cholangiopancreatogram  - Wound Class: II-Clean Contaminated   Surgeon: Guru Teo MD   Assistants(s):    Estimated blood loss: None    Specimens: None   Findings:  EGD  No cause for nausea could be visualized    EUS  Bile duct with sludge  Hyperechoic strands in otherwise unremarkable pancreas    ERCP  Selective biliary cannulation   Sludge removal  A 7 Fr single pigtailed stent was placed in CBD    Recommendations  - Further follow up with Dr Cole

## 2018-02-28 NOTE — ANESTHESIA PREPROCEDURE EVALUATION
Anesthesia Evaluation     .             ROS/MED HX    ENT/Pulmonary:  - neg pulmonary ROS     Neurologic:  - neg neurologic ROS     Cardiovascular:  - neg cardiovascular ROS       METS/Exercise Tolerance:     Hematologic:  - neg hematologic  ROS       Musculoskeletal:  - neg musculoskeletal ROS       GI/Hepatic:     (+) Other GI/Hepatic Chronic epigastric abdominal pain      Renal/Genitourinary:  - ROS Renal section negative       Endo:  - neg endo ROS       Psychiatric:  - neg psychiatric ROS       Infectious Disease:  - neg infectious disease ROS       Malignancy:      - no malignancy   Other:    - neg other ROS                 Physical Exam  Normal systems: cardiovascular, pulmonary and dental    Airway   Mallampati: I  TM distance: >3 FB  Neck ROM: full    Dental     Cardiovascular   Rhythm and rate: regular and normal      Pulmonary    breath sounds clear to auscultation                    Anesthesia Plan      History & Physical Review  History and physical reviewed and following examination; no interval change.    ASA Status:  2 .    NPO Status:  > 8 hours    Plan for General and ETT with Propofol and Intravenous induction. Maintenance will be Balanced.    PONV prophylaxis:  Ondansetron (or other 5HT-3), Dexamethasone or Solumedrol and Scopolamine patch       Postoperative Care  Postoperative pain management:  IV analgesics.      Consents  Anesthetic plan, risks, benefits and alternatives discussed with:  Patient..                          .

## 2018-02-28 NOTE — DISCHARGE INSTRUCTIONS
[facility]  Same-Day Surgery   Adult Discharge Orders & Instructions     For 24 hours after surgery    1. Get plenty of rest.  A responsible adult must stay with you for at least 24 hours after you leave the hospital.   2. Do not drive or use heavy equipment.  If you have weakness or tingling, don't drive or use heavy equipment until this feeling goes away.  3. Do not drink alcohol.  4. Avoid strenuous or risky activities.  Ask for help when climbing stairs.   5. You may feel lightheaded.  IF so, sit for a few minutes before standing.  Have someone help you get up.   6. If you have nausea (feel sick to your stomach): Drink only clear liquids such as apple juice, ginger ale, broth or 7-Up.  Rest may also help.  Be sure to drink enough fluids.  Move to a regular diet as you feel able.  7. You may have a slight fever. Call the doctor if your fever is over 100.5  F (37.7 C) (taken under the tongue) or lasts longer than 24 hours.  8. You may have a dry mouth, a sore throat, muscle aches or trouble sleeping.  These should go away after 24 hours.  9. Do not make important or legal decisions.   Call your doctor for any of the followin.  Signs of infection (fever, growing tenderness at the surgery site, a large amount of drainage or bleeding, severe pain, foul-smelling drainage, redness, swelling).    2. It has been over 8 to 10 hours since surgery and you are still not able to urinate (pass water).    3.  Headache for over 24 hours.      To contact a doctor, call Dr. Bruce at the Gastroenterology Clinic @ [phone] or:        [phone] and ask for the resident on call for Gastroenterology  (answered 24 hours a day)      Emergency Department:    [facility]: [phone]       (TTY for hearing impaired: [phone])

## 2018-02-28 NOTE — OR NURSING
Julia remains sleepy and unsure if nausea and pain are controlled. She took an oxycodone and is going to phase II to see how she does. Dr. Carlisle still to see her. VSS

## 2018-02-28 NOTE — IP AVS SNAPSHOT
MRN:[id number]                      After Visit Summary   2/28/2018    Glenny Keene    MRN: [id number]           Thank you!     Thank you for choosing [facility] for your care. Our goal is always to provide you with excellent care. Hearing back from our patients is one way we can continue to improve our services. Please take a few minutes to complete the written survey that you may receive in the mail after you visit with us. Thank you!        Patient Information     Date Of Birth          1980        About your hospital stay     You were admitted on:  February 28, 2018 You last received care in the:  Same Day Surgery [facility]    You were discharged on:  February 28, 2018       Who to Call     For medical emergencies, please call 911.  For non-urgent questions about your medical care, please call your primary care provider or clinic, [phone]  For questions related to your surgery, please call your surgery clinic        Attending Provider     Provider Guru Noni Vasques MD Gastroenterology       Primary Care Provider Office Phone # Fax #    Tee Coy [phone]      After Care Instructions     Discharge Instructions       Resume pre procedure diet            Discharge Instructions       Restart home medications.                  Further instructions from your care team       [facility]  Same-Day Surgery   Adult Discharge Orders & Instructions     For 24 hours after surgery    1. Get plenty of rest.  A responsible adult must stay with you for at least 24 hours after you leave the hospital.   2. Do not drive or use heavy equipment.  If you have weakness or tingling, don't drive or use heavy equipment until this feeling goes away.  3. Do not drink alcohol.  4. Avoid strenuous or risky activities.  Ask for help when climbing stairs.   5. You may feel lightheaded.  IF so, sit for a few  minutes before standing.  Have someone help you get up.   6. If you have nausea (feel sick to your stomach): Drink only clear liquids such as apple juice, ginger ale, broth or 7-Up.  Rest may also help.  Be sure to drink enough fluids.  Move to a regular diet as you feel able.  7. You may have a slight fever. Call the doctor if your fever is over 100.5  F (37.7 C) (taken under the tongue) or lasts longer than 24 hours.  8. You may have a dry mouth, a sore throat, muscle aches or trouble sleeping.  These should go away after 24 hours.  9. Do not make important or legal decisions.   Call your doctor for any of the followin.  Signs of infection (fever, growing tenderness at the surgery site, a large amount of drainage or bleeding, severe pain, foul-smelling drainage, redness, swelling).    2. It has been over 8 to 10 hours since surgery and you are still not able to urinate (pass water).    3.  Headache for over 24 hours.      To contact a doctor, call Dr. Bruce at the Gastroenterology Clinic @ [phone] or:        [phone] and ask for the resident on call for Gastroenterology  (answered 24 hours a day)      Emergency Department:    [facility]: [phone]       (TTY for hearing impaired: [phone])        Additional Information     If you use hormonal birth control (such as the pill, patch, ring or implants): You'll need a second form of birth control for 7 days (condoms, a diaphragm or contraceptive foam). While in the hospital, you received a medicine called Bridion. Your normal birth control will not work as well for a week after taking this medicine.          Pending Results     No orders found from 2018 to 3/1/2018.            Admission Information     Date & Time Provider Department Dept. Phone    2018 Guru Noni Bruce MD Same Day Surgery [facility] [phone]      Your Vitals Were     Blood Pressure Pulse Temperature Respirations Height Weight  "   109/81 76 97.8  F (36.6  C) (Oral) 16 1.753 m (5' 9\") 79.4 kg (175 lb 0.7 oz)    Pulse Oximetry BMI (Body Mass Index)                95% 25.85 kg/m2          MyChart Information     [facility] gives you secure access to your electronic health record. If you see a primary care provider, you can also send messages to your care team and make appointments. If you have questions, please call your primary care clinic.  If you do not have a primary care provider, please call [phone] and they will assist you.        Care EveryWhere ID     This is your Care EveryWhere ID. This could be used by other organizations to access your [place] medical records  [id number]        Equal Access to Services     HAM JOHNSON : Alysia Paiz, familia lopez, jaiden santa, bobby glass. So [facility] [phone].    ATENCIÓN: Si habla español, tiene a baron disposición servicios gratuitos de asistencia lingüística. Llame al [phone].    We comply with applicable federal civil rights laws and [place] laws. We do not discriminate on the basis of race, color, national origin, age, disability, sex, sexual orientation, or gender identity.               Review of your medicines      UNREVIEWED medicines. Ask your doctor about these medicines        Dose / Directions    albuterol 108 (90 BASE) MCG/ACT Inhaler   Commonly known as:  PROAIR HFA/PROVENTIL HFA/VENTOLIN HFA        Dose:  2 puff   Inhale 2 puffs into the lungs every 6 hours   Refills:  0       amylase-lipase-protease [id number] UNITS Cpep per EC capsule   Commonly known as:  CREON   Used for:  Abdominal pain, right upper quadrant        Take 2 with meals / 1 snacks, up to 9 per day.   Quantity:  450 capsule   Refills:  6       CELEXA PO        Dose:  40 mg   Take 40 mg by mouth daily   Refills:  0       fluticasone 27.5 MCG/SPRAY spray   Commonly known as:  VERAMYST        Refills:  0       hydrOXYzine 50 MG tablet "   Commonly known as:  ATARAX        Refills:  0       LORATADINE-D 24HR PO        Refills:  0                Protect others around you: Learn how to safely use, store and throw away your medicines at www.disposemymeds.org.             Medication List: This is a list of all your medications and when to take them. Check marks below indicate your daily home schedule. Keep this list as a reference.      Medications           Morning Afternoon Evening Bedtime As Needed    albuterol 108 (90 BASE) MCG/ACT Inhaler   Commonly known as:  PROAIR HFA/PROVENTIL HFA/VENTOLIN HFA   Inhale 2 puffs into the lungs every 6 hours                                amylase-lipase-protease [id number] UNITS Cpep per EC capsule   Commonly known as:  CREON   Take 2 with meals / 1 snacks, up to 9 per day.                                CELEXA PO   Take 40 mg by mouth daily                                fluticasone 27.5 MCG/SPRAY spray   Commonly known as:  VERAMYST                                hydrOXYzine 50 MG tablet   Commonly known as:  ATARAX                                LORATADINE-D 24HR PO

## 2018-02-28 NOTE — ANESTHESIA CARE TRANSFER NOTE
Patient: Glenny Keene    Procedure(s):  Upper Endoscopy Ultrasound, Endoscopic Retrograde Cholangiopancreatogram with biliary  sludge removal and stent placement - Wound Class: II-Clean Contaminated  Endoscopic Retrograde Cholangiopancreatogram  - Wound Class: II-Clean Contaminated    Diagnosis: Elevated LFT's  Diagnosis Additional Information: No value filed.    Anesthesia Type:   General, ETT     Note:  Airway :Nasal Cannula  Patient transferred to:PACU  Comments: Pt extubated in the OR without incident or complications. Pt VSS upon arrival to the PACU. Pt has some c/o N/mild pain. Pt care report given to receiving RN.       Vitals: (Last set prior to Anesthesia Care Transfer)    CRNA VITALS  2/28/2018 0856 - 2/28/2018 0932      2/28/2018             Resp Rate (observed): (!)  2                Electronically Signed By: LILIBETH Brown CRNA  February 28, 2018  9:32 AM

## 2018-02-28 NOTE — IP AVS SNAPSHOT
Same Day Surgery [address] [address]    Phone:  [phone]                                       After Visit Summary   2/28/2018    Glenny Keene    MRN: [id number]           After Visit Summary Signature Page     I have received my discharge instructions, and my questions have been answered. I have discussed any challenges I see with this plan with the nurse or doctor.    ..........................................................................................................................................  Patient/Patient Representative Signature      ..........................................................................................................................................  Patient Representative Print Name and Relationship to Patient    ..................................................               ................................................  Date                                            Time    ..........................................................................................................................................  Reviewed by Signature/Title    ...................................................              ..............................................  Date                                                            Time

## 2018-02-28 NOTE — OR NURSING
Patient arrived to phase II recovery lethargic, sleepy. Still c/o some nausea and abdominal pain. Paged Dr. Bruce (gastroeneterology). He came to bedside to assess her, states he wants to keep her for observation another 15-30 minutes and he will come back and decide whether she can discharge home or if she should be admitted.

## 2018-02-28 NOTE — OR NURSING
Dr. Bruce at bedside in phase 2, patient states she feels better.  Pt okay to DC home per Dr. Bruce.

## 2018-02-28 NOTE — ANESTHESIA POSTPROCEDURE EVALUATION
Patient: Glenny Keene    Procedure(s):  Upper Endoscopy Ultrasound, Endoscopic Retrograde Cholangiopancreatogram with biliary  sludge removal and stent placement - Wound Class: II-Clean Contaminated  Endoscopic Retrograde Cholangiopancreatogram  - Wound Class: II-Clean Contaminated    Diagnosis:Elevated LFT's  Diagnosis Additional Information: No value filed.    Anesthesia Type:  General, ETT    Note:  Anesthesia Post Evaluation    Patient location during evaluation: PACU  Patient participation: Able to fully participate in evaluation  Level of consciousness: awake and alert  Pain management: adequate  Airway patency: patent  Cardiovascular status: hemodynamically stable  Respiratory status: acceptable  Hydration status: stable  PONV: none     Anesthetic complications: None          Last vitals:  Vitals:    02/28/18 0547   BP: 114/74   Pulse: 76   Resp: 16   Temp: 36.9  C (98.4  F)   SpO2: 99%         Electronically Signed By: Shyam Parmar MD  February 28, 2018  9:42 AM

## 2018-03-01 ENCOUNTER — CARE COORDINATION (OUTPATIENT)
Dept: GASTROENTEROLOGY | Facility: CLINIC | Age: 38
End: 2018-03-01

## 2018-03-01 DIAGNOSIS — K80.50 BILE DUCT STONE: Primary | ICD-10-CM

## 2018-03-01 NOTE — PROGRESS NOTES
This RNCC tried to call the patient to check up after her procedure yesterday, this RNCC was unable to leave a voicemail message as her mailbox was full.

## 2018-04-11 ENCOUNTER — TELEPHONE (OUTPATIENT)
Dept: GASTROENTEROLOGY | Facility: CLINIC | Age: 38
End: 2018-04-11

## 2018-04-11 ENCOUNTER — CARE COORDINATION (OUTPATIENT)
Dept: GASTROENTEROLOGY | Facility: CLINIC | Age: 38
End: 2018-04-11

## 2018-04-11 NOTE — TELEPHONE ENCOUNTER
Film called to grab images. Once received, will then have reviewed for stent verification.     Erika PHILLIPS RN Coordinator  Dr. Cole, Dr. Singleton & Dr. Bruce   Advanced Endoscopy  [phone]

## 2018-04-11 NOTE — TELEPHONE ENCOUNTER
Pt called triage, She is calling Erika melo who had left a message that she needs an xray done regarding p/p ERCP stent placement. Pt states that she didn't know she had a stent but she was in the ED in [place] last week and they had done a ct-scan on which the stent was visible. She is wondering if it is ok to have those mages pushed here rather than doing an other xray. I told her that was fine. She will have images pushed to [facility] asap. Amanda Mancilla

## 2018-04-11 NOTE — PROGRESS NOTES
Message left on the significant other's phone as Glenny's voicemail box is full.     Message left for him to ask her to call to schedule her x-ray, can be done at any [facility] clinic, number left for her to call to schedule. Advised she will need to have procedure to remove the stent if it is still in place.   Any questions, they can call the clinic, number given.     Erika PHILLIPS RN Coordinator  Dr. Cole, Dr. Singleton & Dr. Bruce   [facility]  [phone]

## 2018-04-13 ENCOUNTER — TELEPHONE (OUTPATIENT)
Dept: GASTROENTEROLOGY | Facility: CLINIC | Age: 38
End: 2018-04-13

## 2018-04-13 NOTE — TELEPHONE ENCOUNTER
Pt calling regarding CT scan images she had sent 2 days ago. States she is checking to see if they were received. Explained to pt that they were not received and she should have them resent. Pt states understanding. Suzette NICHOLS LPN

## 2018-04-17 NOTE — TELEPHONE ENCOUNTER
Pt calling again.  Per Svitlana, yes we received images and they were forwarded today to Dr. Cole for review.    Pt is awaiting a call back with our advice.

## 2018-04-20 ENCOUNTER — CARE COORDINATION (OUTPATIENT)
Dept: GASTROENTEROLOGY | Facility: CLINIC | Age: 38
End: 2018-04-20

## 2018-04-20 NOTE — PROGRESS NOTES
Stent follow-up for ERCP 2/28/2018 with Dr. Bruce per Dr. Cole    Images reviewed by provider: STENT IS GONE     Called patient and is advised of the above. She was very confused as to why she had a stent placed and states she was not informed of the post procedure information. Explained we did try to call her and her voicemail was full. I did talk to her afterwards and she was aware she needed an X-ray and that she called back to say she had a CT done in the ED locally for abdominal pain, so she was aware of the post procedure plan once we talked. She does remember this conversation.     She is still experiencing pain and wants to know what the next plan is. Advised she should schedule a follow-up with Dr. Cole to discuss next steps, number given to schedule.   Verbalized understanding and amenable to plan.     Erika PHILLIPS RN Coordinator  Dr. Cole, Dr. Singleton & Dr. Bruce   [facility]  [phone]

## 2019-10-01 ENCOUNTER — HEALTH MAINTENANCE LETTER (OUTPATIENT)
Age: 39
End: 2019-10-01

## 2020-09-30 ENCOUNTER — APPOINTMENT (OUTPATIENT)
Dept: CT IMAGING | Facility: CLINIC | Age: 40
End: 2020-09-30
Attending: FAMILY MEDICINE
Payer: COMMERCIAL

## 2020-09-30 ENCOUNTER — HOSPITAL ENCOUNTER (EMERGENCY)
Facility: CLINIC | Age: 40
Discharge: HOME | End: 2020-09-30
Attending: FAMILY MEDICINE | Admitting: FAMILY MEDICINE
Payer: COMMERCIAL

## 2020-09-30 VITALS
WEIGHT: 175.1 LBS | HEART RATE: 88 BPM | TEMPERATURE: 98.2 F | OXYGEN SATURATION: 99 % | RESPIRATION RATE: 16 BRPM | BODY MASS INDEX: 25.86 KG/M2 | DIASTOLIC BLOOD PRESSURE: 60 MMHG | SYSTOLIC BLOOD PRESSURE: 100 MMHG

## 2020-09-30 DIAGNOSIS — N10 ACUTE PYELONEPHRITIS: ICD-10-CM

## 2020-09-30 LAB
ALBUMIN UR-MCNC: 100 MG/DL
ANION GAP SERPL CALCULATED.3IONS-SCNC: 5 MMOL/L (ref 3–14)
APPEARANCE UR: ABNORMAL
BACTERIA #/AREA URNS HPF: ABNORMAL /HPF
BASOPHILS # BLD AUTO: 0.1 10E9/L (ref 0–0.2)
BASOPHILS NFR BLD AUTO: 0.8 %
BILIRUB UR QL STRIP: NEGATIVE
BUN SERPL-MCNC: 15 MG/DL (ref 7–30)
CALCIUM SERPL-MCNC: 9.2 MG/DL (ref 8.5–10.1)
CHLORIDE SERPL-SCNC: 108 MMOL/L (ref 94–109)
CO2 SERPL-SCNC: 29 MMOL/L (ref 20–32)
COLOR UR AUTO: YELLOW
CREAT SERPL-MCNC: 0.98 MG/DL (ref 0.52–1.04)
DIFFERENTIAL METHOD BLD: NORMAL
EOSINOPHIL NFR BLD AUTO: 1.4 %
ERYTHROCYTE [DISTWIDTH] IN BLOOD BY AUTOMATED COUNT: 12.2 % (ref 10–15)
GFR SERPL CREATININE-BSD FRML MDRD: 71 ML/MIN/{1.73_M2}
GLUCOSE SERPL-MCNC: 82 MG/DL (ref 70–99)
GLUCOSE UR STRIP-MCNC: NEGATIVE MG/DL
HCG UR QL: NEGATIVE
HCT VFR BLD AUTO: 44.2 % (ref 35–47)
HGB BLD-MCNC: 14.7 G/DL (ref 11.7–15.7)
HGB UR QL STRIP: ABNORMAL
IMM GRANULOCYTES # BLD: 0 10E9/L (ref 0–0.4)
IMM GRANULOCYTES NFR BLD: 0.3 %
KETONES UR STRIP-MCNC: NEGATIVE MG/DL
LEUKOCYTE ESTERASE UR QL STRIP: ABNORMAL
LYMPHOCYTES # BLD AUTO: 1.6 10E9/L (ref 0.8–5.3)
LYMPHOCYTES NFR BLD AUTO: 17.2 %
MCH RBC QN AUTO: 30.2 PG (ref 26.5–33)
MCHC RBC AUTO-ENTMCNC: 33.3 G/DL (ref 31.5–36.5)
MCV RBC AUTO: 91 FL (ref 78–100)
MONOCYTES # BLD AUTO: 0.7 10E9/L (ref 0–1.3)
MONOCYTES NFR BLD AUTO: 7.7 %
NEUTROPHILS # BLD AUTO: 6.7 10E9/L (ref 1.6–8.3)
NEUTROPHILS NFR BLD AUTO: 72.6 %
NITRATE UR QL: NEGATIVE
NRBC # BLD AUTO: 0 10*3/UL
NRBC BLD AUTO-RTO: 0 /100
PH UR STRIP: 6 PH (ref 5–7)
PLATELET # BLD AUTO: 268 10E9/L (ref 150–450)
POTASSIUM SERPL-SCNC: 4 MMOL/L (ref 3.4–5.3)
RBC # BLD AUTO: 4.87 10E12/L (ref 3.8–5.2)
RBC #/AREA URNS AUTO: >182 /HPF (ref 0–2)
SODIUM SERPL-SCNC: 142 MMOL/L (ref 133–144)
SOURCE: ABNORMAL
SP GR UR STRIP: 1.02 (ref 1–1.03)
SQUAMOUS #/AREA URNS AUTO: 3 /HPF (ref 0–1)
UROBILINOGEN UR STRIP-MCNC: 0 MG/DL (ref 0–2)
WBC # BLD AUTO: 9.2 10E9/L (ref 4–11)
WBC #/AREA URNS AUTO: 1352 /HPF (ref 0–5)
WBC CLUMPS #/AREA URNS HPF: PRESENT /HPF

## 2020-09-30 PROCEDURE — 81003 URINALYSIS AUTO W/O SCOPE: CPT | Performed by: FAMILY MEDICINE

## 2020-09-30 PROCEDURE — 99285 EMERGENCY DEPT VISIT HI MDM: CPT | Mod: 25 | Performed by: FAMILY MEDICINE

## 2020-09-30 PROCEDURE — 96375 TX/PRO/DX INJ NEW DRUG ADDON: CPT | Performed by: FAMILY MEDICINE

## 2020-09-30 PROCEDURE — 87088 URINE BACTERIA CULTURE: CPT | Performed by: FAMILY MEDICINE

## 2020-09-30 PROCEDURE — 96374 THER/PROPH/DIAG INJ IV PUSH: CPT | Performed by: FAMILY MEDICINE

## 2020-09-30 PROCEDURE — 81025 URINE PREGNANCY TEST: CPT | Performed by: FAMILY MEDICINE

## 2020-09-30 PROCEDURE — 36415 COLL VENOUS BLD VENIPUNCTURE: CPT | Performed by: FAMILY MEDICINE

## 2020-09-30 PROCEDURE — 25000128 H RX IP 250 OP 636: Performed by: FAMILY MEDICINE

## 2020-09-30 PROCEDURE — 87186 SC STD MICRODIL/AGAR DIL: CPT | Performed by: FAMILY MEDICINE

## 2020-09-30 PROCEDURE — 74176 CT ABD & PELVIS W/O CONTRAST: CPT

## 2020-09-30 PROCEDURE — 25800030 ZZH RX IP 258 OP 636: Performed by: FAMILY MEDICINE

## 2020-09-30 PROCEDURE — 80048 BASIC METABOLIC PNL TOTAL CA: CPT | Performed by: FAMILY MEDICINE

## 2020-09-30 PROCEDURE — 85025 COMPLETE CBC W/AUTO DIFF WBC: CPT | Performed by: FAMILY MEDICINE

## 2020-09-30 PROCEDURE — 87086 URINE CULTURE/COLONY COUNT: CPT | Performed by: FAMILY MEDICINE

## 2020-09-30 PROCEDURE — 96361 HYDRATE IV INFUSION ADD-ON: CPT | Performed by: FAMILY MEDICINE

## 2020-09-30 PROCEDURE — 99284 EMERGENCY DEPT VISIT MOD MDM: CPT | Mod: Z6 | Performed by: FAMILY MEDICINE

## 2020-09-30 RX ORDER — CIPROFLOXACIN 500 MG/1
500 TABLET, FILM COATED ORAL 2 TIMES DAILY
Qty: 20 TABLET | Refills: 0 | Status: SHIPPED | OUTPATIENT
Start: 2020-09-30 | End: 2020-10-10

## 2020-09-30 RX ORDER — PHENAZOPYRIDINE HYDROCHLORIDE 200 MG/1
200 TABLET, FILM COATED ORAL 3 TIMES DAILY
Qty: 9 TABLET | Refills: 0 | Status: SHIPPED | OUTPATIENT
Start: 2020-09-30 | End: 2020-10-03

## 2020-09-30 RX ORDER — SODIUM CHLORIDE 9 MG/ML
INJECTION, SOLUTION INTRAVENOUS CONTINUOUS
Status: DISCONTINUED | OUTPATIENT
Start: 2020-09-30 | End: 2020-09-30 | Stop reason: HOSPADM

## 2020-09-30 RX ORDER — KETOROLAC TROMETHAMINE 30 MG/ML
30 INJECTION, SOLUTION INTRAMUSCULAR; INTRAVENOUS ONCE
Status: COMPLETED | OUTPATIENT
Start: 2020-09-30 | End: 2020-09-30

## 2020-09-30 RX ORDER — ONDANSETRON 2 MG/ML
4 INJECTION INTRAMUSCULAR; INTRAVENOUS EVERY 30 MIN PRN
Status: DISCONTINUED | OUTPATIENT
Start: 2020-09-30 | End: 2020-09-30 | Stop reason: HOSPADM

## 2020-09-30 RX ORDER — ONDANSETRON 4 MG/1
4 TABLET, ORALLY DISINTEGRATING ORAL EVERY 8 HOURS PRN
Qty: 10 TABLET | Refills: 0 | Status: SHIPPED | OUTPATIENT
Start: 2020-09-30 | End: 2020-10-03

## 2020-09-30 RX ADMIN — SODIUM CHLORIDE: 9 INJECTION, SOLUTION INTRAVENOUS at 15:19

## 2020-09-30 RX ADMIN — ONDANSETRON 4 MG: 2 INJECTION INTRAMUSCULAR; INTRAVENOUS at 15:18

## 2020-09-30 RX ADMIN — KETOROLAC TROMETHAMINE 30 MG: 30 INJECTION, SOLUTION INTRAMUSCULAR at 15:20

## 2020-09-30 NOTE — ED PROVIDER NOTES
History     Chief Complaint   Patient presents with     Abdominal Pain     HPI  Glenny Keene is a 40 year old female who presents with concerns of lower abdominal pain is been going on for the past 2 weeks and then she has had some blood intermittently in her urine.  She states that the symptoms were getting better and then started getting bad again yesterday.  She states that she was seen last week and had a urine done and they thought she was just dehydrated.  Denies any fevers or chills.  Bowel movements have been normal.  Nothing makes her symptoms better or worse.  Patient denies any nausea or vomiting.    Allergies:  Allergies   Allergen Reactions     Latex Rash     Sulfa Drugs Hives, Itching and Rash       Problem List:    There are no active problems to display for this patient.       Past Medical History:    Past Medical History:   Diagnosis Date     PONV (postoperative nausea and vomiting)        Past Surgical History:    Past Surgical History:   Procedure Laterality Date     APPENDECTOMY       C/SECTION, CLASSICAL      X2     CHOLECYSTECTOMY       ENDOSCOPIC RETROGRADE CHOLANGIOPANCREATOGRAM N/A 2/28/2018    Procedure: COMBINED ENDOSCOPIC RETROGRADE CHOLANGIOPANCREATOGRAPHY, PLACE TUBE/STENT;;  Surgeon: Guru Noni Bruce MD;  Location: [facility] OR     ENDOSCOPIC ULTRASOUND UPPER GASTROINTESTINAL TRACT (GI) N/A 2/28/2018    Procedure: ENDOSCOPIC ULTRASOUND, ESOPHAGOSCOPY / UPPER GASTROINTESTINAL TRACT (GI);  Upper Endoscopy Ultrasound, Endoscopic Retrograde Cholangiopancreatogram with biliary  sludge removal and stent placement;  Surgeon: Guru Noni Bruce MD;  Location: [facility] OR     HYSTERECTOMY RADICAL         Family History:    History reviewed. No pertinent family history.    Social History:  Marital Status:  Single [1]  Social History     Tobacco Use     Smoking status: Current Every Day Smoker     Packs/day: 1.00     Smokeless tobacco: Never Used   Substance  Use Topics     Alcohol use: No     Drug use: No        Medications:    albuterol (PROAIR HFA/PROVENTIL HFA/VENTOLIN HFA) 108 (90 BASE) MCG/ACT Inhaler  amylase-lipase-protease (CREON) 68567-35858 UNITS CPEP per EC capsule  Citalopram Hydrobromide (CELEXA PO)  fluticasone (VERAMYST) 27.5 MCG/SPRAY spray  hydrOXYzine (ATARAX) 50 MG tablet  Loratadine-Pseudoephedrine (LORATADINE-D 24HR PO)          Review of Systems   All other systems reviewed and are negative.      Physical Exam   BP: 108/77  Pulse: 100  Temp: 98.2  F (36.8  C)  Resp: 18  Weight: 79.4 kg (175 lb 1.6 oz)  SpO2: 99 %      Physical Exam  Vitals signs and nursing note reviewed.   Constitutional:       General: She is not in acute distress.     Appearance: She is well-developed. She is not diaphoretic.   HENT:      Head: Normocephalic and atraumatic.      Nose: Nose normal.      Mouth/Throat:      Pharynx: No oropharyngeal exudate.   Eyes:      Conjunctiva/sclera: Conjunctivae normal.   Neck:      Musculoskeletal: Normal range of motion and neck supple.   Cardiovascular:      Rate and Rhythm: Normal rate and regular rhythm.      Heart sounds: Normal heart sounds. No murmur. No friction rub.   Pulmonary:      Effort: Pulmonary effort is normal. No respiratory distress.      Breath sounds: Normal breath sounds. No stridor. No wheezing or rales.   Abdominal:      General: Bowel sounds are normal. There is no distension.      Palpations: Abdomen is soft. There is no mass.      Tenderness: There is abdominal tenderness in the suprapubic area. There is no guarding.   Musculoskeletal: Normal range of motion.         General: No tenderness.   Skin:     General: Skin is warm and dry.      Capillary Refill: Capillary refill takes less than 2 seconds.      Findings: No erythema.   Neurological:      Mental Status: She is alert and oriented to person, place, and time.   Psychiatric:         Judgment: Judgment normal.         ED Course        Procedures        Results  for orders placed or performed during the hospital encounter of 09/30/20 (from the past 24 hour(s))   UA reflex to Microscopic and Culture    Specimen: Midstream Urine   Result Value Ref Range    Color Urine Yellow     Appearance Urine Cloudy     Glucose Urine Negative NEG^Negative mg/dL    Bilirubin Urine Negative NEG^Negative    Ketones Urine Negative NEG^Negative mg/dL    Specific Gravity Urine 1.021 1.003 - 1.035    Blood Urine Large (A) NEG^Negative    pH Urine 6.0 5.0 - 7.0 pH    Protein Albumin Urine 100 (A) NEG^Negative mg/dL    Urobilinogen mg/dL 0.0 0.0 - 2.0 mg/dL    Nitrite Urine Negative NEG^Negative    Leukocyte Esterase Urine Large (A) NEG^Negative    Source Midstream Urine     RBC Urine >182 (H) 0 - 2 /HPF    WBC Urine 1,352 (H) 0 - 5 /HPF    WBC Clumps Present (A) NEG^Negative /HPF    Bacteria Urine Few (A) NEG^Negative /HPF    Squamous Epithelial /HPF Urine 3 (H) 0 - 1 /HPF   HCG qualitative urine (UPT)   Result Value Ref Range    HCG Qual Urine Negative NEG^Negative   CBC with platelets differential   Result Value Ref Range    WBC 9.2 4.0 - 11.0 10e9/L    RBC Count 4.87 3.8 - 5.2 10e12/L    Hemoglobin 14.7 11.7 - 15.7 g/dL    Hematocrit 44.2 35.0 - 47.0 %    MCV 91 78 - 100 fl    MCH 30.2 26.5 - 33.0 pg    MCHC 33.3 31.5 - 36.5 g/dL    RDW 12.2 10.0 - 15.0 %    Platelet Count 268 150 - 450 10e9/L    Diff Method Automated Method     % Neutrophils 72.6 %    % Lymphocytes 17.2 %    % Monocytes 7.7 %    % Eosinophils 1.4 %    % Basophils 0.8 %    % Immature Granulocytes 0.3 %    Nucleated RBCs 0 0 /100    Absolute Neutrophil 6.7 1.6 - 8.3 10e9/L    Absolute Lymphocytes 1.6 0.8 - 5.3 10e9/L    Absolute Monocytes 0.7 0.0 - 1.3 10e9/L    Absolute Basophils 0.1 0.0 - 0.2 10e9/L    Abs Immature Granulocytes 0.0 0 - 0.4 10e9/L    Absolute Nucleated RBC 0.0    Basic metabolic panel   Result Value Ref Range    Sodium 142 133 - 144 mmol/L    Potassium 4.0 3.4 - 5.3 mmol/L    Chloride 108 94 - 109 mmol/L     Carbon Dioxide 29 20 - 32 mmol/L    Anion Gap 5 3 - 14 mmol/L    Glucose 82 70 - 99 mg/dL    Urea Nitrogen 15 7 - 30 mg/dL    Creatinine 0.98 0.52 - 1.04 mg/dL    GFR Estimate 71 >60 mL/min/[1.73_m2]    GFR Estimate If Black 82 >60 mL/min/[1.73_m2]    Calcium 9.2 8.5 - 10.1 mg/dL   CT Abdomen Pelvis w/o Contrast    Narrative    CT ABDOMEN AND PELVIS WITHOUT CONTRAST 9/30/2020 3:43 PM    CLINICAL HISTORY: Right-sided abdominal and back pain, hematuria.    TECHNIQUE: CT scan of the abdomen and pelvis was performed without IV  contrast. Multiplanar reformats were obtained. Dose reduction  techniques were used.  CONTRAST: None.    COMPARISON: Outside CT abdomen and pelvis from [facility] dated  4/5/2018.    FINDINGS:   LOWER CHEST: Normal.    HEPATOBILIARY: Patient is status post cholecystectomy. The liver is  grossly normal for a noncontrast CT. No intrahepatic or extrahepatic  biliary ductal dilatation is seen.    PANCREAS: Normal.    SPLEEN: Normal.    ADRENAL GLANDS: Normal.    KIDNEYS/BLADDER: The kidneys are normal in appearance for a  noncontrast CT. No hydronephrosis, nephrolithiasis, hydroureter, or  ureteral calculus is identified. Urinary bladder is grossly  unremarkable.    BOWEL: The colon is of normal caliber without pericolonic inflammatory  change to suggest acute diverticulitis. A moderate amount of stool is  seen in the colon. Appendix is not seen consistent with surgical  history. There are surgical clips adjacent to the cecum. Mildly  prominent small bowel loops are seen in the left side of the abdomen  measuring up to 3.1 cm in diameter. Somewhat difficult to evaluate  given the lack of oral contrast. No abnormally dilated gas-filled  loops of bowel to suggest bowel obstruction are seen. The stomach  contains a small amount of fluid and ingested material and moderate  amount of air but is otherwise unremarkable. There are postop changes  near the gastric cardia with metallic streak artifact in  the same  region limiting evaluation of the upper abdomen/hiatal region.    LYMPH NODES: No adenopathy is identified.    VASCULATURE: Aorta is grossly within normal limits. No obvious  vascular abnormality is seen. Evaluation of the vascularity is limited  due to lack of IV contrast.    PELVIC ORGANS: Uterus is absent, consistent with surgical history.  Ovaries are not well seen. No adnexal masses are identified.    OTHER: No free fluid or free air is seen in the peritoneal cavity.    MUSCULOSKELETAL: There are degenerative changes of the lower lumbar  spine. No aggressive osseous lesions or acute osseous fractures are  identified.      Impression    IMPRESSION:   1. Postop changes status post cholecystectomy, hysterectomy,  appendectomy.  2. No evidence for urinary system calculus or obstruction.  3. No evidence for bowel obstruction is seen although there are mildly  prominent loops of proximal small bowel (jejunum) in the left upper  abdomen of doubtful clinical significance.  4. No definite etiology for patient's symptoms of right-sided  abdominal and back pain with hematuria is seen. Ureter is somewhat  difficult to follow although no evidence for ureteral calculus is  seen.  5. Postop changes in the epigastric region cause streak artifact  mildly limiting evaluation of that region.  6. Evaluation of the solid organs and of the vascular structures of  the abdomen and pelvis is limited due to lack of IV contrast.       Medications   ondansetron (ZOFRAN) injection 4 mg (4 mg Intravenous Given 9/30/20 1518)   sodium chloride 0.9% infusion ( Intravenous New Bag 9/30/20 1519)   ketorolac (TORADOL) injection 30 mg (30 mg Intravenous Given 9/30/20 1520)     Urine comes back is very consistent with a urinary tract infection.  She has a history of some back pain something to cover her for an acute pyelonephritis.  I did a CT scan because of the blood to rule out stones and there was no signs of kidney stones.  Will  discharge on Cipro and patient was given some Pyridium and Zofran for nausea.  Patient will follow-up if there is no improvement by Monday.    Assessments & Plan (with Medical Decision Making)  Acute pyelonephritis     I have reviewed the nursing notes.    I have reviewed the findings, diagnosis, plan and need for follow up with the patient.              9/30/2020   [facility] EMERGENCY DEPARTMENT     Jimmie Lozoya MD  09/30/20 [id number]

## 2020-09-30 NOTE — ED AVS SNAPSHOT
[facility]  [address] [place] [address]  Phone:  [phone]  Fax:  [phone]                                    Glenny Keene   MRN: [id number]    Department:  [facility]   Date of Visit:  9/30/2020           After Visit Summary Signature Page    I have received my discharge instructions, and my questions have been answered. I have discussed any challenges I see with this plan with the nurse or doctor.    ..........................................................................................................................................  Patient/Patient Representative Signature      ..........................................................................................................................................  Patient Representative Print Name and Relationship to Patient    ..................................................               ................................................  Date                                   Time    ..........................................................................................................................................  Reviewed by Signature/Title    ...................................................              ..............................................  Date                                               Time          22EPIC Rev 08/18

## 2020-09-30 NOTE — ED TRIAGE NOTES
She has lower abdominal pain for the past 2 weeks.  She has had blood in her urine on and off and has it again today.

## 2020-10-01 LAB
BACTERIA SPEC CULT: ABNORMAL
Lab: ABNORMAL
SPECIMEN SOURCE: ABNORMAL

## 2020-10-01 NOTE — RESULT ENCOUNTER NOTE
Emergency Dept/Urgent Care discharge antibiotic: Ciprofloxacin (Cipro) 500 mg tablet, 1 tablet (500 mg) by mouth 2 times daily for 10 days.  Date of Rx (If Applicable): 9/30/20  Recommendations per [place] ED Lab result protocol - Urine culture protocol.

## 2020-10-02 NOTE — RESULT ENCOUNTER NOTE
Final Urine Culture Report on 10/01/20  Emergency Dept/Urgent Care discharge antibiotic prescribed: Ciprofloxacin (Cipro) 500 mg tablet, 1 tablet (500 mg) by mouth 2 times daily for 10 days.  #1. Bacteria, 50,000 to 100,000 colonies/ml Escherichia coli, is SUSCEPTIBLE to Antibiotic.    As per [place] ED Lab Result protocol, no change in antibiotic therapy.

## 2021-01-15 ENCOUNTER — HEALTH MAINTENANCE LETTER (OUTPATIENT)
Age: 41
End: 2021-01-15

## 2021-09-04 ENCOUNTER — HEALTH MAINTENANCE LETTER (OUTPATIENT)
Age: 41
End: 2021-09-04

## 2022-02-19 ENCOUNTER — HEALTH MAINTENANCE LETTER (OUTPATIENT)
Age: 42
End: 2022-02-19

## 2022-10-16 ENCOUNTER — HEALTH MAINTENANCE LETTER (OUTPATIENT)
Age: 42
End: 2022-10-16

## 2023-04-01 ENCOUNTER — HEALTH MAINTENANCE LETTER (OUTPATIENT)
Age: 43
End: 2023-04-01

## 2023-06-17 ENCOUNTER — HEALTH MAINTENANCE LETTER (OUTPATIENT)
Age: 43
End: 2023-06-17

## (undated) DEVICE — ENDO CAP AND TUBING STERILE FOR ENDOGATOR  100130

## (undated) DEVICE — SOL WATER IRRIG 1000ML BOTTLE 2F7114

## (undated) DEVICE — ENDO FUSION OMNI-TOME G31903

## (undated) DEVICE — BIOPSY VALVE BIOSHIELD 00711135

## (undated) DEVICE — KIT CONNECTOR FOR OLYMPUS ENDOSCOPES DEFENDO 100310

## (undated) DEVICE — PACK ENDOSCOPY GI CUSTOM UMMC

## (undated) DEVICE — TUBING SUCTION 10'X3/16" N510

## (undated) DEVICE — WIRE GUIDE 0.025"X270CM STR VISIGLIDE G-240-2527S

## (undated) DEVICE — KIT ENDO FIRST STEP DISINFECTANT 200ML W/POUCH EP-4

## (undated) DEVICE — ENDO TUBING CO2 SMARTCAP STERILE DISP 100145CO2EXT

## (undated) DEVICE — BALLOON ULTRAMATRIX FOR OLYMPUS EUS LINEAR LATEX FREE USB-OL

## (undated) DEVICE — TAPE CLOTH 3" CARDINAL 3TRCL03

## (undated) DEVICE — SUCTION MANIFOLD DORNOCH ULTRA CART UL-CL500

## (undated) DEVICE — ENDO BITE BLOCK ADULT OMNI-BLOC

## (undated) DEVICE — CATH RETRIEVAL BALLOON EXTRACTOR PRO RX-S INJ ABOVE 9-12MM

## (undated) RX ORDER — OXYCODONE HYDROCHLORIDE 5 MG/1
TABLET ORAL
Status: DISPENSED
Start: 2018-02-28

## (undated) RX ORDER — LIDOCAINE HYDROCHLORIDE 20 MG/ML
INJECTION, SOLUTION EPIDURAL; INFILTRATION; INTRACAUDAL; PERINEURAL
Status: DISPENSED
Start: 2018-02-28

## (undated) RX ORDER — FENTANYL CITRATE 50 UG/ML
INJECTION, SOLUTION INTRAMUSCULAR; INTRAVENOUS
Status: DISPENSED
Start: 2018-02-28

## (undated) RX ORDER — PROPOFOL 10 MG/ML
INJECTION, EMULSION INTRAVENOUS
Status: DISPENSED
Start: 2018-02-28

## (undated) RX ORDER — IOPAMIDOL 510 MG/ML
INJECTION, SOLUTION INTRAVASCULAR
Status: DISPENSED
Start: 2018-02-28

## (undated) RX ORDER — ONDANSETRON 2 MG/ML
INJECTION INTRAMUSCULAR; INTRAVENOUS
Status: DISPENSED
Start: 2018-02-28

## (undated) RX ORDER — INDOMETHACIN 50 MG/1
SUPPOSITORY RECTAL
Status: DISPENSED
Start: 2018-02-28

## (undated) RX ORDER — DEXAMETHASONE SODIUM PHOSPHATE 4 MG/ML
INJECTION, SOLUTION INTRA-ARTICULAR; INTRALESIONAL; INTRAMUSCULAR; INTRAVENOUS; SOFT TISSUE
Status: DISPENSED
Start: 2018-02-28

## (undated) RX ORDER — SIMETHICONE 20 MG/.3ML
EMULSION ORAL
Status: DISPENSED
Start: 2018-02-28

## (undated) RX ORDER — SCOLOPAMINE TRANSDERMAL SYSTEM 1 MG/1
PATCH, EXTENDED RELEASE TRANSDERMAL
Status: DISPENSED
Start: 2018-02-28

## (undated) RX ORDER — HYDROMORPHONE HCL/0.9% NACL/PF 0.2MG/0.2
SYRINGE (ML) INTRAVENOUS
Status: DISPENSED
Start: 2018-02-28